# Patient Record
Sex: MALE | Race: WHITE | Employment: OTHER | URBAN - NONMETROPOLITAN AREA
[De-identification: names, ages, dates, MRNs, and addresses within clinical notes are randomized per-mention and may not be internally consistent; named-entity substitution may affect disease eponyms.]

---

## 2024-08-09 ENCOUNTER — HOSPITAL ENCOUNTER (OUTPATIENT)
Dept: GENERAL RADIOLOGY | Age: 79
Discharge: HOME | End: 2024-08-09

## 2024-08-09 DIAGNOSIS — Z00.6 EXAMINATION FOR NORMAL COMPARISON FOR CLINICAL RESEARCH: ICD-10-CM

## 2024-08-09 RX ORDER — FLUTICASONE PROPIONATE AND SALMETEROL 100; 50 UG/1; UG/1
1 POWDER RESPIRATORY (INHALATION) EVERY 12 HOURS
COMMUNITY
End: 2024-08-12 | Stop reason: ALTCHOICE

## 2024-08-09 RX ORDER — IBUPROFEN 200 MG
200 TABLET ORAL EVERY 6 HOURS PRN
COMMUNITY

## 2024-08-09 RX ORDER — AMLODIPINE AND BENAZEPRIL HYDROCHLORIDE 5; 40 MG/1; MG/1
1 CAPSULE ORAL DAILY
COMMUNITY

## 2024-08-09 RX ORDER — ALBUTEROL SULFATE 90 UG/1
2 AEROSOL, METERED RESPIRATORY (INHALATION) EVERY 6 HOURS PRN
COMMUNITY

## 2024-08-09 NOTE — PROGRESS NOTES
Neck Circumference -   16.5 inches  Mallampati - 2    Lung Nodule Screening     [] Qualifies    [x] Does not qualify   [] Declined    [] Completed    
inhalers.  He still having exertional dyspnea with the current inhaler therapy  -Hx of chronic tobacco smoking for 37 years with 2 to 3 packs of cigarettes per day.  He quit smoking in 1997.  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  He underwent surgery in his both eyes for glaucoma by his ophthalmologist. He denies any urinary retention in the past.  He used to have a history of urinary retention in the past.  He is currently taking Flomax.  -Essential hypertension on treatment medications under control  -Chronic cough and hoarseness of voice may be due to his current medication I.e benazepril VS current inhaler therapy  -Abnormal chest x-ray with a left lower lobe opacity in a patient with history of chronic tobacco smoking- ?  Etiology.  Need further evaluation      Recommendations/Plan:  -Stop Advair discus 100/51 inhalation twice daily.  -Will start patient onTrelegy Ellipta 100mcg/ 62.5mcg/25mcg per puff, 1puff daily in am. Santino Wreneducated and demonstrated in my office how to use Trelegy Ellipta. He verbalizes understanding. He was detailed about mechanism of action of drug along with associated side effects. He agreed to take the risk and medication. He verbalizes understanding.  -Start patient on Albuterol HFA 90mcg/Spray MDI, 2puffs  Q6Hprn. He  was informed about adverse effects of Albuterol HFA. He verbalizes understanding.  -He was advised to stop taking benazepril due to her chronic cough and hoarseness of voice ( May be side effect from ACE inhibitor i.e benazepril) after discussing with his family physician. His family physician/ need to start on alternate medication with out cough and hoarseness of voice as side effect for optimal control of blood pressure.  - Will send Txki-7-[id number] swab today and to be followed at next clinic visit.   - He was educated and demonstrated in my office how to use inhalers.   -Santino Wren needs no home O2 at rest. He needs 3LPM of home O2 with exercise.He will

## 2024-08-12 ENCOUNTER — OFFICE VISIT (OUTPATIENT)
Dept: PULMONOLOGY | Age: 79
End: 2024-08-12
Payer: MEDICARE

## 2024-08-12 VITALS
SYSTOLIC BLOOD PRESSURE: 114 MMHG | OXYGEN SATURATION: 93 % | BODY MASS INDEX: 29.28 KG/M2 | HEART RATE: 71 BPM | WEIGHT: 193.2 LBS | DIASTOLIC BLOOD PRESSURE: 78 MMHG | TEMPERATURE: 97.6 F | HEIGHT: 68 IN

## 2024-08-12 DIAGNOSIS — R93.89 ABNORMAL CHEST X-RAY: ICD-10-CM

## 2024-08-12 DIAGNOSIS — J98.11 ATELECTASIS: ICD-10-CM

## 2024-08-12 DIAGNOSIS — Z77.22 TOBACCO SMOKE EXPOSURE: ICD-10-CM

## 2024-08-12 DIAGNOSIS — J44.9 STAGE 3 SEVERE COPD BY GOLD CLASSIFICATION (HCC): ICD-10-CM

## 2024-08-12 DIAGNOSIS — J44.9 STAGE 3 SEVERE COPD BY GOLD CLASSIFICATION (HCC): Primary | ICD-10-CM

## 2024-08-12 PROCEDURE — 94618 PULMONARY STRESS TESTING: CPT | Performed by: INTERNAL MEDICINE

## 2024-08-12 PROCEDURE — 1123F ACP DISCUSS/DSCN MKR DOCD: CPT | Performed by: INTERNAL MEDICINE

## 2024-08-12 PROCEDURE — 99204 OFFICE O/P NEW MOD 45 MIN: CPT | Performed by: INTERNAL MEDICINE

## 2024-08-13 ENCOUNTER — TELEPHONE (OUTPATIENT)
Dept: PULMONOLOGY | Age: 79
End: 2024-08-13

## 2024-08-13 NOTE — TELEPHONE ENCOUNTER
Phoned patient's insurance, [employer], provider services line at [phone] to see if prior authorization is required for CT chest with contrast scheduled for 8/19/24 at [facility].    Waited on hold for over ten minutes to see if prior authorization was required for CT chest with contrast before hanging up.

## 2024-08-13 NOTE — TELEPHONE ENCOUNTER
Phoned Debra at [phone] to start prior authorization request.  Spoke with representative Stefania MAGALLON  After going through clinical questions authorization request was approved for CPT code 83833 with diagnosis code R93.89.      Case number: [id number]  Authorization number: [id number]  Valid: 8/13/24-2/9/25    Per automated system, for future [employer] prior authorizations, call [phone].

## 2024-08-13 NOTE — TELEPHONE ENCOUNTER
Cover sheet with insurance communication & CT order faxed to Zara at [employer] (see media).

## 2024-08-13 NOTE — TELEPHONE ENCOUNTER
Logged into [employer] portal to determine if prior authorization is required for CT chest with contrast.  Per [employer] portal, prior authorization IS required.    Authorizations handled by [employer].   Phoned number: [phone]

## 2024-08-19 ENCOUNTER — TELEPHONE (OUTPATIENT)
Dept: PULMONOLOGY | Age: 79
End: 2024-08-19

## 2024-08-19 ENCOUNTER — HOSPITAL ENCOUNTER (OUTPATIENT)
Dept: CT IMAGING | Age: 79
Discharge: HOME | End: 2024-08-19
Attending: RADIOLOGY

## 2024-08-19 DIAGNOSIS — Z00.6 ENCOUNTER FOR EXAMINATION FOR NORMAL COMPARISON OR CONTROL IN CLINICAL RESEARCH PROGRAM: ICD-10-CM

## 2024-08-19 DIAGNOSIS — J44.9 STAGE 3 SEVERE COPD BY GOLD CLASSIFICATION (HCC): ICD-10-CM

## 2024-08-19 DIAGNOSIS — R93.89 ABNORMAL CHEST X-RAY: ICD-10-CM

## 2024-08-19 DIAGNOSIS — Z77.22 TOBACCO SMOKE EXPOSURE: ICD-10-CM

## 2024-08-19 DIAGNOSIS — J98.11 ATELECTASIS: ICD-10-CM

## 2024-08-20 ENCOUNTER — TELEPHONE (OUTPATIENT)
Dept: CARDIAC REHAB | Age: 79
End: 2024-08-20

## 2024-08-20 NOTE — TELEPHONE ENCOUNTER
PULMONARY REHABILITATION REFERRAL  COPD Exacerbation    Pulmonary Rehab Evaluation order received.  Spoke with patient about the benefits of supervised comfortable, progressive exercise with oxygen saturation monitoring and pulmonary education.  Pt is from [place] and states if he does rehab he would rather do closer to home, in [place].  I told pt I would send order to [place].

## 2024-08-21 DIAGNOSIS — J44.9 STAGE 3 SEVERE COPD BY GOLD CLASSIFICATION (HCC): ICD-10-CM

## 2024-08-21 PROCEDURE — 36415 COLL VENOUS BLD VENIPUNCTURE: CPT | Performed by: INTERNAL MEDICINE

## 2024-09-10 ENCOUNTER — OFFICE VISIT (OUTPATIENT)
Dept: PULMONOLOGY | Age: 79
End: 2024-09-10
Payer: MEDICARE

## 2024-09-10 VITALS
TEMPERATURE: 97.7 F | WEIGHT: 192.8 LBS | HEIGHT: 70 IN | OXYGEN SATURATION: 93 % | DIASTOLIC BLOOD PRESSURE: 82 MMHG | HEART RATE: 67 BPM | SYSTOLIC BLOOD PRESSURE: 132 MMHG | BODY MASS INDEX: 27.6 KG/M2

## 2024-09-10 DIAGNOSIS — J18.9 PNEUMONIA OF RIGHT LOWER LOBE DUE TO INFECTIOUS ORGANISM: ICD-10-CM

## 2024-09-10 DIAGNOSIS — R93.89 ABNORMAL CT OF THE CHEST: ICD-10-CM

## 2024-09-10 DIAGNOSIS — J44.1 COPD EXACERBATION (HCC): Primary | ICD-10-CM

## 2024-09-10 DIAGNOSIS — Z14.8 ALPHA 1-ANTITRYPSIN PIMS PHENOTYPE: ICD-10-CM

## 2024-09-10 PROCEDURE — 99214 OFFICE O/P EST MOD 30 MIN: CPT | Performed by: INTERNAL MEDICINE

## 2024-09-10 PROCEDURE — 1123F ACP DISCUSS/DSCN MKR DOCD: CPT | Performed by: INTERNAL MEDICINE

## 2024-09-10 RX ORDER — CEFDINIR 300 MG/1
300 CAPSULE ORAL 2 TIMES DAILY
Qty: 14 CAPSULE | Refills: 0 | Status: SHIPPED | OUTPATIENT
Start: 2024-09-10 | End: 2024-09-17

## 2024-09-10 RX ORDER — FLUTICASONE PROPIONATE AND SALMETEROL 100; 50 UG/1; UG/1
1 POWDER RESPIRATORY (INHALATION) 2 TIMES DAILY
COMMUNITY
Start: 2024-09-03

## 2024-09-10 RX ORDER — AZITHROMYCIN 250 MG/1
TABLET, FILM COATED ORAL
Qty: 1 PACKET | Refills: 0 | Status: SHIPPED | OUTPATIENT
Start: 2024-09-10 | End: 2024-09-20

## 2024-09-10 RX ORDER — PREDNISONE 10 MG/1
40 TABLET ORAL DAILY
Qty: 20 TABLET | Refills: 0 | Status: SHIPPED | OUTPATIENT
Start: 2024-09-10 | End: 2024-09-15

## 2024-09-10 RX ORDER — TAMSULOSIN HYDROCHLORIDE 0.4 MG/1
0.4 CAPSULE ORAL DAILY
COMMUNITY

## 2024-09-12 LAB — ALPHA-1 ANTITRYPSIN: 119 MG/DL (ref 90–200)

## 2024-11-05 NOTE — PROGRESS NOTES
[facility]  Pulmonary medicine clinic follow-up note.      Patient: Santino Wren  : 1945      Chief complaint/[place]: Santino Wren is a 79 y.o. old male came for follow-up regarding his COPD and shortness of breath after having a CT scan of chest with out IV contrast and serum Quda-7-Uzjykjbooht serum level requested at the last visit.    He was initially referred from Dr. Sabas Esquivel MD.     On today's questioning:  He admits to cough with clear expectoration.  He denies hemoptysis.  He denies fever or chills.   He denies recent hospitalizations or emergency room visits.  He received another course of prednisone and antibiotics from his family physician Dr. Sabas Esquivel MD for his COPD exacerbation in 2024.  He felt better.  However, he was noted to have increased sputum production after stopping prednisone.    He is using his prescribed inhalers with excellent compliance.   He is using his rescue inhaler frequently    He denies recent loss of weight or appetite changes.   He denies recent decline in functional status.    He is currently on treatment with following inhalers:  Advair discus 100/50 1 puff via inhalation twice daily-he is currently using generic version of Advair.  He did not want to use Trelegy due to its cost.  Albuterol HFA 2 puffs every 6 hourly as needed.    He is currently using no home O2 at rest. He needs 3LPM of home O2 with exercise and 2 L of oxygen via nasal cannula at nighttime    He ever diagnosed with pulmonary tuberculosis in the past:NO  He ever recently exposed to patients with tuberculosis:NO  He ever recently travelled to endemic places of tuberculosis or out side [place]:NO  His ever tested for PPD in the past: NO    He ever diagnosed with COVID-19 infection in the past: He suffered with COVID-19 infection in .  He visited emergency room at [facility] at that time.  He was treated as

## 2024-11-12 ENCOUNTER — TELEPHONE (OUTPATIENT)
Dept: PULMONOLOGY | Age: 79
End: 2024-11-12

## 2024-11-12 NOTE — TELEPHONE ENCOUNTER
CT chest without contrast order, 9/10/24 office note, 8/19/24 CT chest with contrast report faxed to [facility] Clinical [facility] at [phone] for additional clinical review for prior authorization request (see media).

## 2024-11-12 NOTE — TELEPHONE ENCOUNTER
Phoned Debra at [phone] to start prior authorization request for CT chest without contrast scheduled for 11/25/24 at [facility].    Spoke with [facility] representative Roxanna BAUM  After going through clinical questions request was denied.  Roxanna stated additional information is needed for CPT code 96546 with diagnosis code R93.89.  Clinical information can be faxed to [facility] Clinical Review at [phone].  Case reference number: [id number]

## 2024-11-19 NOTE — TELEPHONE ENCOUNTER
Received fax communication from [employer] regarding CT chest without contrast authorization.  [employer] is offering an opportunity to provide additional information in a Peer-to -Peer discussion prior to 11/22/2024 (see media).    Medical Director phone: [phone]  Reference number: [id number]    Must be completed prior to 11/22/2024.    Please advise, thank you!

## 2024-11-20 NOTE — TELEPHONE ENCOUNTER
Cover sheet with insurance communication and CT order faxed to Zara at [employer] (see media).

## 2024-12-05 ENCOUNTER — HOSPITAL ENCOUNTER (OUTPATIENT)
Dept: GENERAL RADIOLOGY | Age: 79
Discharge: HOME | End: 2024-12-05
Attending: RADIOLOGY

## 2024-12-05 ENCOUNTER — HOSPITAL ENCOUNTER (OUTPATIENT)
Dept: CT IMAGING | Age: 79
Discharge: HOME | End: 2024-12-05
Attending: RADIOLOGY

## 2024-12-05 ENCOUNTER — OFFICE VISIT (OUTPATIENT)
Dept: PULMONOLOGY | Age: 79
End: 2024-12-05
Payer: MEDICARE

## 2024-12-05 ENCOUNTER — TELEPHONE (OUTPATIENT)
Dept: PULMONOLOGY | Age: 79
End: 2024-12-05

## 2024-12-05 VITALS
HEIGHT: 70 IN | TEMPERATURE: 98 F | OXYGEN SATURATION: 92 % | SYSTOLIC BLOOD PRESSURE: 134 MMHG | BODY MASS INDEX: 28.09 KG/M2 | DIASTOLIC BLOOD PRESSURE: 80 MMHG | HEART RATE: 88 BPM | WEIGHT: 196.2 LBS

## 2024-12-05 DIAGNOSIS — Z14.8 ALPHA 1-ANTITRYPSIN PIMS PHENOTYPE: ICD-10-CM

## 2024-12-05 DIAGNOSIS — R93.89 ABNORMAL CT OF THE CHEST: ICD-10-CM

## 2024-12-05 DIAGNOSIS — J44.9 STAGE 3 SEVERE COPD BY GOLD CLASSIFICATION (HCC): ICD-10-CM

## 2024-12-05 DIAGNOSIS — J44.9 STAGE 3 SEVERE COPD BY GOLD CLASSIFICATION (HCC): Primary | ICD-10-CM

## 2024-12-05 DIAGNOSIS — Z00.6 EXAMINATION FOR NORMAL COMPARISON OR CONTROL IN CLINICAL RESEARCH: ICD-10-CM

## 2024-12-05 DIAGNOSIS — Z00.6 EXAMINATION FOR NORMAL COMPARISON FOR CLINICAL RESEARCH: ICD-10-CM

## 2024-12-05 DIAGNOSIS — J98.11 ATELECTASIS: ICD-10-CM

## 2024-12-05 PROCEDURE — 99214 OFFICE O/P EST MOD 30 MIN: CPT | Performed by: INTERNAL MEDICINE

## 2024-12-05 PROCEDURE — 1123F ACP DISCUSS/DSCN MKR DOCD: CPT | Performed by: INTERNAL MEDICINE

## 2024-12-05 PROCEDURE — 1159F MED LIST DOCD IN RCRD: CPT | Performed by: INTERNAL MEDICINE

## 2024-12-05 RX ORDER — TIOTROPIUM BROMIDE 18 UG/1
18 CAPSULE ORAL; RESPIRATORY (INHALATION) DAILY
Qty: 90 CAPSULE | Refills: 3 | Status: SHIPPED | OUTPATIENT
Start: 2024-12-05 | End: 2024-12-05 | Stop reason: SDUPTHER

## 2024-12-05 RX ORDER — FLUTICASONE PROPIONATE AND SALMETEROL 250; 50 UG/1; UG/1
1 POWDER RESPIRATORY (INHALATION) EVERY 12 HOURS
Qty: 3 EACH | Refills: 3 | Status: SHIPPED | OUTPATIENT
Start: 2024-12-05 | End: 2024-12-05 | Stop reason: SDUPTHER

## 2024-12-05 RX ORDER — FLUTICASONE PROPIONATE AND SALMETEROL 250; 50 UG/1; UG/1
1 POWDER RESPIRATORY (INHALATION) EVERY 12 HOURS
Qty: 1 EACH | Refills: 0 | Status: SHIPPED | OUTPATIENT
Start: 2024-12-05 | End: 2025-01-04

## 2024-12-05 RX ORDER — FLUTICASONE PROPIONATE AND SALMETEROL 250; 50 UG/1; UG/1
1 POWDER RESPIRATORY (INHALATION) EVERY 12 HOURS
Qty: 1 EACH | Refills: 11 | Status: SHIPPED | OUTPATIENT
Start: 2024-12-05 | End: 2024-12-05 | Stop reason: SDUPTHER

## 2024-12-05 RX ORDER — TIOTROPIUM BROMIDE 18 UG/1
18 CAPSULE ORAL; RESPIRATORY (INHALATION) DAILY
Qty: 30 CAPSULE | Refills: 0 | Status: SHIPPED | OUTPATIENT
Start: 2024-12-05 | End: 2025-01-04

## 2024-12-05 NOTE — TELEPHONE ENCOUNTER
Pts wife called back and Spiriva is 200 a month so they do not want to get it. They do want Advair still sent 1 month locally and the rest [facility] per [facility] message below.

## 2024-12-05 NOTE — TELEPHONE ENCOUNTER
Advair & Spiriva inhaler scripts, wrote for the year, were sent to [facility] in [place] today at patient's appointment.    Patient requesting a one time script be sent to [facility] in [place] for Advair & Spiriva inhaler today so patient can start medications.  Then a script for the year be sent to [facility] for Advair & Spiriva inhalers.    Please advise, thank you!

## 2024-12-05 NOTE — PATIENT INSTRUCTIONS
Recommendations/Plan:  -Schedule patient for CT scan of chest without IV contrast in 6months to follow abnormal CT Chest with a right lower lobe ? pneumonia with a streaky appearance noted on his CT scan of chest performed on 25 November 2024. CT scan need to be done 2 days before clinic visit  -Will change Advair discus to 250/50 puff twice daily due to increased mucus production after stopping oral prednisone.  He is currently using generic version of Advair I.e fluticasone propionate and salmeterol 100/50 1INH twice daily.  -Will start patient on Spiriva Respimat 2 INH once daily in am. Santino Wren educated and demonstrated by me in my office how to use Spiriva Respimat . Patient verbalizes understanding. He was detailed about mechanism of action of drug along with associated side effects. He agreed to take the risk and medication. He verbalizes understanding.  -Continue patient on Albuterol HFA 90mcg/Spray MDI, 2puffs  Q6Hprn.   -He refused to go on Albuterol neb treatment,   -Santino Wren needs no home O2 at rest. He needs 3LPM of home O2 with exercise and 2 L of oxygen via nasal cannula at nighttime  -Santino Wren advised to continue prescribed inhalers and keep good compliance.  -Patient educated to update his pneumococcal vaccine, Covid vaccine, Shingles vaccine, Respiratory Syncytial Virus vaccine with family physician and take influenza vaccine in coming season with out fail. Patient verbalizes understanding.  -He was offered a Pulmonary rehab consult for pulmonary rehab therapy for his COPD once cleared by his family physician. How ever at the end of discussion he refused and verbalizes understanding of consequences of her decision.  He would like to do exercises and rehab therapy at his home.  -He refused to go for Gastro Enterology consultation with Dr.Abdulla Mouna MD for further evaluation of abnormal MS genotype of alpha-1 antitrypsin. Will cancel his referral to GI specialist. Patient

## 2024-12-05 NOTE — PROGRESS NOTES
Neck Circumference -   16.5 inches  Mallampati - 2    Lung Nodule Screening     [] Qualifies    [x] Does not qualify   [] Declined    [] Completed     Regular rate and rhythm, Heart sounds S1 S2 present, no murmurs, rubs or gallops

## 2024-12-06 NOTE — TELEPHONE ENCOUNTER
Phoned patient to notify, per Dr Calderon, Spiriva Respimat was changed to Spiriva HandiHaler 18 mcg 1 capsule inhalation daily.  Informed patient both scripts for Advair and Spiriva HandiHaler were sent to [facility] in [place].  Notified patient the change of Spiriva Respimat to Spiriva HandiHaler is covered by patient's medical insurance according to [facility].  Patient expressed understanding and had no further questions at this time.

## 2024-12-19 DIAGNOSIS — Z14.8 ALPHA 1-ANTITRYPSIN PIMS PHENOTYPE: ICD-10-CM

## 2024-12-19 DIAGNOSIS — R93.89 ABNORMAL CT OF THE CHEST: ICD-10-CM

## 2024-12-19 DIAGNOSIS — J44.9 STAGE 3 SEVERE COPD BY GOLD CLASSIFICATION (HCC): ICD-10-CM

## 2024-12-19 DIAGNOSIS — J98.11 ATELECTASIS: ICD-10-CM

## 2024-12-19 RX ORDER — TIOTROPIUM BROMIDE 18 UG/1
18 CAPSULE ORAL; RESPIRATORY (INHALATION) DAILY
Qty: 90 CAPSULE | Refills: 3 | Status: SHIPPED | OUTPATIENT
Start: 2024-12-19 | End: 2025-12-19

## 2024-12-19 RX ORDER — FLUTICASONE PROPIONATE AND SALMETEROL 250; 50 UG/1; UG/1
1 POWDER RESPIRATORY (INHALATION) EVERY 12 HOURS
Qty: 3 EACH | Refills: 3 | Status: SHIPPED | OUTPATIENT
Start: 2024-12-19 | End: 2025-12-19

## 2025-01-17 NOTE — PATIENT INSTRUCTIONS
Recommendations/Plan:  -Stop Advair discus 100/51 inhalation twice daily.  -Will start patient onTrelegy Ellipta 100mcg/ 62.5mcg/25mcg per puff, 1puff daily in am. Santino Wreneducated and demonstrated in my office how to use Trelegy Ellipta. He verbalizes understanding. He was detailed about mechanism of action of drug along with associated side effects. He agreed to take the risk and medication. He verbalizes understanding.  -Start patient on Albuterol HFA 90mcg/Spray MDI, 2puffs  Q6Hprn. He  was informed about adverse effects of Albuterol HFA. He verbalizes understanding.  -He was advised to stop taking benazepril due to her chronic cough and hoarseness of voice ( May be side effect from ACE inhibitor i.e benazepril) after discussing with his family physician. His family physician/ need to start on alternate medication with out cough and hoarseness of voice as side effect for optimal control of blood pressure.  - Will send Rxvn-4-Zymrbxhtcbq swab today and to be followed at next clinic visit.   - He was educated and demonstrated in my office how to use inhalers.   -Santino Wren needs no home O2 at rest. He needs 3LPM of home O2 with exercise.He will be evaluated for nocturnal home O2 requirement by doing home nocturnal pulse oximetry study on room air- see separte order.  -At the request of patient, physician order was given to evaluate patient for oxygen conserving device.  -Will order a CT scan of chest with IV contrast to further evaluate his abnormal chest x-ray dated 12 July 2024 due to his history of chronic tobacco smoking to rule out neoplastic process.  -Santino Wren advised to continue prescribed inhalers and keep good compliance.  - Patient educated to update his pneumococcal vaccine, Covid vaccine, Shingles vaccine, Respiratory Syncytial Virus vaccine with family physician and take influenza vaccine in coming season with out fail. Patient verbalizes understanding.  - Schedule patient  DVT ppx: Lovenox

## 2025-05-19 ENCOUNTER — TELEPHONE (OUTPATIENT)
Age: 80
End: 2025-05-19

## 2025-05-27 ENCOUNTER — TELEPHONE (OUTPATIENT)
Dept: PULMONOLOGY | Age: 80
End: 2025-05-27

## 2025-05-27 NOTE — TELEPHONE ENCOUNTER
Cover sheet with insurance communication & CT order faxed to Zarina at [employer] (see media).

## 2025-05-27 NOTE — TELEPHONE ENCOUNTER
Phoned Debra at [phone] to start prior authorization request for CT chest without contrast scheduled for 6/3/25 at [facility].     Spoke to Reinaldo ROYAL with Debra.  After going through clinical questions, approval was received for CT chest without contrast.   Authorization: [id number]  Valid: 5/27/25-11/23/25  Case number: [id number]

## 2025-06-05 DIAGNOSIS — Z14.8 ALPHA 1-ANTITRYPSIN PIMS PHENOTYPE: ICD-10-CM

## 2025-06-05 DIAGNOSIS — J44.9 STAGE 3 SEVERE COPD BY GOLD CLASSIFICATION (HCC): ICD-10-CM

## 2025-06-05 DIAGNOSIS — J98.11 ATELECTASIS: ICD-10-CM

## 2025-06-05 DIAGNOSIS — R93.89 ABNORMAL CT OF THE CHEST: ICD-10-CM

## 2025-06-12 ENCOUNTER — HOSPITAL ENCOUNTER (OUTPATIENT)
Dept: CT IMAGING | Age: 80
Discharge: HOME | End: 2025-06-12
Attending: RADIOLOGY

## 2025-06-12 ENCOUNTER — OFFICE VISIT (OUTPATIENT)
Dept: PULMONOLOGY | Age: 80
End: 2025-06-12
Payer: MEDICARE

## 2025-06-12 VITALS
SYSTOLIC BLOOD PRESSURE: 126 MMHG | OXYGEN SATURATION: 95 % | BODY MASS INDEX: 27.23 KG/M2 | HEIGHT: 70 IN | HEART RATE: 72 BPM | DIASTOLIC BLOOD PRESSURE: 74 MMHG | WEIGHT: 190.2 LBS | TEMPERATURE: 97.6 F

## 2025-06-12 DIAGNOSIS — J98.11 ATELECTASIS: ICD-10-CM

## 2025-06-12 DIAGNOSIS — Z00.6 EXAMINATION FOR NORMAL COMPARISON FOR CLINICAL RESEARCH: ICD-10-CM

## 2025-06-12 DIAGNOSIS — R91.8 MULTIPLE LUNG NODULES ON CT: ICD-10-CM

## 2025-06-12 DIAGNOSIS — I25.10 CALCIFICATION OF CORONARY ARTERY: ICD-10-CM

## 2025-06-12 DIAGNOSIS — J44.9 STAGE 3 SEVERE COPD BY GOLD CLASSIFICATION (HCC): ICD-10-CM

## 2025-06-12 DIAGNOSIS — R93.89 ABNORMAL CT OF THE CHEST: Primary | ICD-10-CM

## 2025-06-12 DIAGNOSIS — I77.819 DILATATION OF AORTA: ICD-10-CM

## 2025-06-12 DIAGNOSIS — Z14.8 ALPHA 1-ANTITRYPSIN PIMS PHENOTYPE: ICD-10-CM

## 2025-06-12 PROCEDURE — 99214 OFFICE O/P EST MOD 30 MIN: CPT | Performed by: INTERNAL MEDICINE

## 2025-06-12 PROCEDURE — 1159F MED LIST DOCD IN RCRD: CPT | Performed by: INTERNAL MEDICINE

## 2025-06-12 PROCEDURE — 1123F ACP DISCUSS/DSCN MKR DOCD: CPT | Performed by: INTERNAL MEDICINE

## 2025-06-12 RX ORDER — ALBUTEROL SULFATE 90 UG/1
2 INHALANT RESPIRATORY (INHALATION) EVERY 6 HOURS PRN
Qty: 3 EACH | Refills: 3 | Status: SHIPPED | OUTPATIENT
Start: 2025-06-12 | End: 2026-06-12

## 2025-06-12 RX ORDER — FLUTICASONE PROPIONATE AND SALMETEROL 250; 50 UG/1; UG/1
1 POWDER RESPIRATORY (INHALATION) EVERY 12 HOURS
Qty: 3 EACH | Refills: 3 | Status: SHIPPED | OUTPATIENT
Start: 2025-06-12 | End: 2026-06-12

## 2025-06-12 RX ORDER — TIOTROPIUM BROMIDE 18 UG/1
18 CAPSULE ORAL; RESPIRATORY (INHALATION) DAILY
Qty: 90 CAPSULE | Refills: 3 | Status: SHIPPED | OUTPATIENT
Start: 2025-06-12 | End: 2026-06-12

## 2025-06-12 NOTE — PATIENT INSTRUCTIONS
Recommendations/Plan:  -Schedule patient for CT scan of chest without IV contrast in 1year to follow abnormal CT Chest with multiple sub centimeter lung nodules.  -Continue Advair discus to 250/50 puff twice daily.  Refills given in the mail-in format  -Continue Spiriva Respimat 2 INH once daily in am.  Refills given in the mail-in format  -Continue patient on Albuterol HFA 90mcg/Spray MDI, 2puffs  Q6Hprn.   -He refused to go on Albuterol neb treatment,   -Schedule patient for cardiothoracic surgery consult with Dr. Michael MD for further evaluation of Thoracic aortic atherosclerosis with fusiform dilatation of ascending thoracic aorta to 4.3 cm noted on his CT scan of chest performed on 3 Lanie 2025.  -Schedule patient for Cardiology consult with Dr. Charli Rivera MD as soon as possible for further evaluation of Mild coronary artery calcifications noted on his CT scan of chest performed on 3 Lanie 2025.  -Santino Wren needs no home O2 at rest. He needs 3LPM of home O2 with exercise and 2 L of oxygen via nasal cannula at nighttime  -Santino Wren advised to continue prescribed inhalers and keep good compliance.  -Patient educated to update his pneumococcal vaccine, Covid vaccine, Shingles vaccine, Respiratory Syncytial Virus vaccine with family physician and take influenza vaccine in coming season with out fail. Patient verbalizes understanding.  -Schedule patient for Spirometry before clinic visit with Bronchodilator response in 1year.  - Schedule patient for follow up with my clinic in 12months with planned CT scan of chest with out IV contrast with spirometry before clinic visit. He was advised to make early appointment if needed.  - Santinosyed Wren and his wife were educated about my impression and plan.They verbalizes understanding.

## 2025-06-12 NOTE — PROGRESS NOTES
Neck Circumference -   16.5 inches  Mallampati - 2    Lung Nodule Screening     [] Qualifies    [x] Does not qualify   [] Declined    [] Completed    
vomiting.  Neurological: No focal neurologiacal weakness.  Extremities: No edema.  Musculoskeletal: No complaints.  Genitourinary: No complaints.  Hematological: Negative.   Psychiatric/Behavioral: Negative.   Skin: No itching.      Current Medications:        Past Medical History:   Diagnosis Date    COPD (chronic obstructive pulmonary disease) (HCC)     Dyspnea on exertion     Elevated blood pressure reading     History of tobacco use     Hyperlipidemia     Hyperthyroidism     Hypokalemia     Hypoxemia     Impaired fasting glucose     Impaired fasting glucose     Neoplasm     Panlobular emphysema (HCC)     Sepsis (HCC)     Vertigo        Past Surgical History:   Procedure Laterality Date    CATARACT REMOVAL WITH IMPLANT Left 09/04/2024    UMBILICAL HERNIA REPAIR  1988       No Known Allergies    Current Outpatient Medications   Medication Sig Dispense Refill    albuterol sulfate HFA (PROVENTIL;VENTOLIN;PROAIR) 108 (90 Base) MCG/ACT inhaler Inhale 2 puffs into the lungs every 6 hours as needed for Wheezing 3 each 3    fluticasone-salmeterol (ADVAIR DISKUS) 250-50 MCG/ACT AEPB diskus inhaler Inhale 1 puff into the lungs in the morning and 1 puff in the evening. 3 each 3    tiotropium (SPIRIVA HANDIHALER) 18 MCG inhalation capsule Inhale 1 capsule into the lungs daily 90 capsule 3    tamsulosin (FLOMAX) 0.4 MG capsule Take 1 capsule by mouth daily      amLODIPine-benazepril (LOTREL) 5-40 MG per capsule Take 1 capsule by mouth daily 5-10mg      ibuprofen (ADVIL;MOTRIN) 200 MG tablet Take 1 tablet by mouth every 6 hours as needed for Pain      OXYGEN Inhale into the lungs When sleeping       No current facility-administered medications for this visit.       Family History   Problem Relation Age of Onset    Lung Cancer Father 80           Physical Exam:    VITALS:  /74 (BP Site: Right Upper Arm, Patient Position: Sitting, BP Cuff Size: Medium Adult)   Pulse 72   Temp 97.6 °F (36.4 °C) (Skin)   Ht 1.778 m (5'